# Patient Record
Sex: MALE | Race: BLACK OR AFRICAN AMERICAN | URBAN - METROPOLITAN AREA
[De-identification: names, ages, dates, MRNs, and addresses within clinical notes are randomized per-mention and may not be internally consistent; named-entity substitution may affect disease eponyms.]

---

## 2020-05-29 ENCOUNTER — HISTORICAL (OUTPATIENT)
Dept: ADMINISTRATIVE | Facility: HOSPITAL | Age: 50
End: 2020-05-29

## 2025-04-02 ENCOUNTER — HOSPITAL ENCOUNTER (EMERGENCY)
Facility: HOSPITAL | Age: 55
Discharge: HOME | End: 2025-04-02
Attending: EMERGENCY MEDICINE
Payer: COMMERCIAL

## 2025-04-02 VITALS
DIASTOLIC BLOOD PRESSURE: 74 MMHG | SYSTOLIC BLOOD PRESSURE: 129 MMHG | RESPIRATION RATE: 18 BRPM | HEIGHT: 69 IN | HEART RATE: 61 BPM | OXYGEN SATURATION: 97 % | WEIGHT: 189 LBS | TEMPERATURE: 98 F | BODY MASS INDEX: 27.99 KG/M2

## 2025-04-02 DIAGNOSIS — M79.89 LEG SWELLING: ICD-10-CM

## 2025-04-02 DIAGNOSIS — M79.89 RIGHT LEG SWELLING: ICD-10-CM

## 2025-04-02 LAB
ALBUMIN SERPL-MCNC: 3.9 G/DL (ref 3.5–5)
ALBUMIN/GLOB SERPL: 0.8 RATIO (ref 1.1–2)
ALP SERPL-CCNC: 100 UNIT/L (ref 40–150)
ALT SERPL-CCNC: 23 UNIT/L (ref 0–55)
ANION GAP SERPL CALC-SCNC: 7 MEQ/L
AST SERPL-CCNC: 44 UNIT/L (ref 11–45)
BASOPHILS # BLD AUTO: 0.04 X10(3)/MCL
BASOPHILS NFR BLD AUTO: 0.5 %
BILIRUB SERPL-MCNC: 0.6 MG/DL
BUN SERPL-MCNC: 18.4 MG/DL (ref 8.4–25.7)
CALCIUM SERPL-MCNC: 9.3 MG/DL (ref 8.4–10.2)
CHLORIDE SERPL-SCNC: 101 MMOL/L (ref 98–107)
CO2 SERPL-SCNC: 28 MMOL/L (ref 22–29)
CREAT SERPL-MCNC: 0.77 MG/DL (ref 0.72–1.25)
CREAT/UREA NIT SERPL: 24
EOSINOPHIL # BLD AUTO: 0.11 X10(3)/MCL (ref 0–0.9)
EOSINOPHIL NFR BLD AUTO: 1.4 %
ERYTHROCYTE [DISTWIDTH] IN BLOOD BY AUTOMATED COUNT: 12.2 % (ref 11.5–17)
GFR SERPLBLD CREATININE-BSD FMLA CKD-EPI: >60 ML/MIN/1.73/M2
GLOBULIN SER-MCNC: 4.9 GM/DL (ref 2.4–3.5)
GLUCOSE SERPL-MCNC: 76 MG/DL (ref 74–100)
HCT VFR BLD AUTO: 48 % (ref 42–52)
HGB BLD-MCNC: 15.5 G/DL (ref 14–18)
IMM GRANULOCYTES # BLD AUTO: 0.03 X10(3)/MCL (ref 0–0.04)
IMM GRANULOCYTES NFR BLD AUTO: 0.4 %
LYMPHOCYTES # BLD AUTO: 1.9 X10(3)/MCL (ref 0.6–4.6)
LYMPHOCYTES NFR BLD AUTO: 23.4 %
MCH RBC QN AUTO: 29 PG (ref 27–31)
MCHC RBC AUTO-ENTMCNC: 32.3 G/DL (ref 33–36)
MCV RBC AUTO: 89.7 FL (ref 80–94)
MONOCYTES # BLD AUTO: 0.76 X10(3)/MCL (ref 0.1–1.3)
MONOCYTES NFR BLD AUTO: 9.4 %
NEUTROPHILS # BLD AUTO: 5.27 X10(3)/MCL (ref 2.1–9.2)
NEUTROPHILS NFR BLD AUTO: 64.9 %
NRBC BLD AUTO-RTO: 0 %
PLATELET # BLD AUTO: 279 X10(3)/MCL (ref 130–400)
PMV BLD AUTO: 9.4 FL (ref 7.4–10.4)
POTASSIUM SERPL-SCNC: 4.5 MMOL/L (ref 3.5–5.1)
PROT SERPL-MCNC: 8.8 GM/DL (ref 6.4–8.3)
RBC # BLD AUTO: 5.35 X10(6)/MCL (ref 4.7–6.1)
SODIUM SERPL-SCNC: 136 MMOL/L (ref 136–145)
WBC # BLD AUTO: 8.11 X10(3)/MCL (ref 4.5–11.5)

## 2025-04-02 PROCEDURE — 80053 COMPREHEN METABOLIC PANEL: CPT | Performed by: PHYSICIAN ASSISTANT

## 2025-04-02 PROCEDURE — 99284 EMERGENCY DEPT VISIT MOD MDM: CPT | Mod: 25

## 2025-04-02 PROCEDURE — 85025 COMPLETE CBC W/AUTO DIFF WBC: CPT | Performed by: PHYSICIAN ASSISTANT

## 2025-04-02 NOTE — ED PROVIDER NOTES
"Encounter Date: 4/2/2025       History     Chief Complaint   Patient presents with    Leg Swelling     Pt. C/o right upper leg/thigh swelling started Sunday. Reports was seen in hospital recently for heart attack. Here to evaluate leg swelling. Reports taking antibiotics. No noted redness  and denies pain..      Patient states an area of swelling to his mid anterior thigh times several weeks to "months."  Denies any fever, redness, drainage, tenderness, or pain.  Patient denies any injury or trauma.  Patient states that area of swelling has been the same size since it 1st appeared.  Denies any discoloration.  Patient incidentally also states that 4 days ago he was treated at an outside facility in another city for a "heart attack."Patient states that he was seen by his cardiologist at the outside facility and was treated and discharged home yesterday.  Patient denies any chest pain, shortness of breath, dizziness, or any other symptoms.  Past medical history of hypertension, hyperlipidemia, sciatica, MI.     The history is provided by the patient and a relative.     Review of patient's allergies indicates:  No Known Allergies  No past medical history on file.  No past surgical history on file.  No family history on file.  Social History[1]  Review of Systems   Constitutional: Negative.  Negative for fever.   HENT: Negative.     Eyes: Negative.    Respiratory: Negative.  Negative for shortness of breath.    Cardiovascular:  Positive for leg swelling. Negative for chest pain.   Gastrointestinal: Negative.    Endocrine: Negative.    Genitourinary: Negative.    Musculoskeletal: Negative.    Skin: Negative.    Allergic/Immunologic: Negative.    Neurological: Negative.    Hematological: Negative.    Psychiatric/Behavioral: Negative.     All other systems reviewed and are negative.      Physical Exam     Initial Vitals [04/02/25 1322]   BP Pulse Resp Temp SpO2   129/74 61 18 98.2 °F (36.8 °C) 97 %      MAP       --     "     Physical Exam    Nursing note and vitals reviewed.  Constitutional: He appears well-developed and well-nourished. No distress.   HENT:   Head: Normocephalic and atraumatic. Mouth/Throat: Oropharynx is clear and moist.   Eyes: Conjunctivae and EOM are normal. Pupils are equal, round, and reactive to light.   Neck: Neck supple.   Normal range of motion.  Cardiovascular:  Normal rate, regular rhythm, normal heart sounds and intact distal pulses.           Pulses:       Dorsalis pedis pulses are 2+ on the right side and 2+ on the left side.   Pulmonary/Chest: Breath sounds normal. No respiratory distress. He has no wheezes.   Abdominal: Abdomen is soft. Bowel sounds are normal. He exhibits no distension. There is no abdominal tenderness.   Musculoskeletal:         General: No edema. Normal range of motion.      Cervical back: Normal range of motion and neck supple.      Right upper leg: Swelling (There is an area of swelling to the mid anterior thigh.  There is no erythema or tenderness.  There is no fluctuance palpated.  There is no discoloration.  Area appears to be consistent with a cyst versus lipoma.) present. No edema, tenderness or bony tenderness.      Left upper leg: Normal.        Legs:      Neurological: He is alert and oriented to person, place, and time. He has normal strength.   Skin: Skin is warm and dry. No rash noted.   Psychiatric: He has a normal mood and affect. Thought content normal.         ED Course   Procedures  Labs Reviewed   COMPREHENSIVE METABOLIC PANEL - Abnormal       Result Value    Sodium 136      Potassium 4.5      Chloride 101      CO2 28      Glucose 76      Blood Urea Nitrogen 18.4      Creatinine 0.77      Calcium 9.3      Protein Total 8.8 (*)     Albumin 3.9      Globulin 4.9 (*)     Albumin/Globulin Ratio 0.8 (*)     Bilirubin Total 0.6            ALT 23      AST 44      eGFR >60      Anion Gap 7.0      BUN/Creatinine Ratio 24     CBC WITH DIFFERENTIAL - Abnormal     WBC 8.11      RBC 5.35      Hgb 15.5      Hct 48.0      MCV 89.7      MCH 29.0      MCHC 32.3 (*)     RDW 12.2      Platelet 279      MPV 9.4      Neut % 64.9      Lymph % 23.4      Mono % 9.4      Eos % 1.4      Basophil % 0.5      Imm Grans % 0.4      Neut # 5.27      Lymph # 1.90      Mono # 0.76      Eos # 0.11      Baso # 0.04      Imm Gran # 0.03      NRBC% 0.0     CBC W/ AUTO DIFFERENTIAL    Narrative:     The following orders were created for panel order CBC Auto Differential.  Procedure                               Abnormality         Status                     ---------                               -----------         ------                     CBC with Differential[[id number]]       Abnormal            Final result                 Please view results for these tests on the individual orders.          Imaging Results              X-Ray Femur Ap/Lat Right (Final result)  Result time 04/02/25 14:35:29      Final result by Larry Beard MD (04/02/25 14:35:29)                   Narrative:    EXAMINATION  XR FEMUR 2 VIEW RIGHT    CLINICAL HISTORY  Other specified soft tissue disorders    TECHNIQUE  A total of 4 image(s) submitted of the right femur.    COMPARISON  None available at the time of initial interpretation.    FINDINGS  Regional degenerative changes are present.  No convincing acutely displaced fracture or dislocation is identified.  No aggressive osseous lesion, periarticular erosion, or periosteal reaction is appreciated.    The included soft tissues are without acute abnormality.  Scattered vascular calcification is present.    IMPRESSION  1. No convincing acute radiographic abnormality.  2. Chronic secondary details discussed above.      Electronically signed by: Larry Beard  Date:    04/02/2025  Time:    14:35                                     Medications - No data to display  Medical Decision Making  Patient states an area of swelling to his mid anterior thigh times several weeks to  ""months."  Denies any fever, redness, drainage, tenderness, or pain.  Patient denies any injury or trauma.  Patient states that area of swelling has been the same size since it 1st appeared.  Denies any discoloration.  Patient incidentally also states that 4 days ago he was treated at an outside facility in another city for a "heart attack."Patient states that he was seen by his cardiologist at the outside facility and was treated and discharged home yesterday.  Patient denies any chest pain, shortness of breath, dizziness, or any other symptoms.  Past medical history of hypertension, hyperlipidemia, sciatica, MI.     The history is provided by the patient and a relative.       Amount and/or Complexity of Data Reviewed  Labs: ordered. Decision-making details documented in ED Course.  Radiology: ordered. Decision-making details documented in ED Course.  ECG/medicine tests: ordered. Decision-making details documented in ED Course.  Discussion of management or test interpretation with external provider(s): Differential diagnosis (including but not limited to):   Judging by the patient's chief complaint and pertinent history, the patient has the following possible differential diagnoses, including but not limited to the following.  Some of these are deemed to be lower likelihood and some more likely based on my physical exam and history combined with possible lab work and/or imaging studies.   Please see the pertinent studies, and refer to the HPI.  Some of these diagnoses will take further evaluation to fully rule out, perhaps as an outpatient and the patient was encouraged to follow up when discharged for more comprehensive evaluation.  Cyst, lipoma, hematoma, abscess, DVT, superficial thrombophlebitis  Patient's labs are overall unremarkable.  Patient's x-ray of his right femur does not show any acute change.  Patient's venous ultrasound of his right lower extremity is negative for any DVT or superficial vein " thrombosis.  There is a Cortes cyst measuring 1.31 x 3.44 cm in the popliteal fossa.  Ultrasound also noted a complex lesion suggestive of a hematoma to the anterior mid thigh measuring 1.5 x 3.08 x 19 cm which is the area of swelling.  On exam this area appears to be consistent with a lipoma versus cyst.  There is no erythema or tenderness noted.  There is no fluctuance noted.  Discussed patient with  and we will refer patient to outpatient general surgery for further evaluation of complex lesion to anterior mid right thigh.  Discussed discharge patient plan and follow up instructions with patient and his family member.  Both state understanding and agreement.  Patient is asking to be discharged at this time.  ED return precautions were given.                                        Clinical Impression:  Final diagnoses:  [M79.89] Leg swelling  [M79.89] Right leg swelling          ED Disposition Condition    Discharge Stable          ED Prescriptions    None       Follow-up Information       Follow up With Specialties Details Why Contact Info    Primary Care Provider  In 3 days                 [1]         Sue Dsouza, ALEE  04/02/25 1816